# Patient Record
Sex: FEMALE | Race: WHITE
[De-identification: names, ages, dates, MRNs, and addresses within clinical notes are randomized per-mention and may not be internally consistent; named-entity substitution may affect disease eponyms.]

---

## 2018-05-16 ENCOUNTER — HOSPITAL ENCOUNTER (OUTPATIENT)
Dept: HOSPITAL 89 - MRI | Age: 23
End: 2018-05-16
Attending: OPHTHALMOLOGY
Payer: COMMERCIAL

## 2018-05-16 DIAGNOSIS — J34.2: ICD-10-CM

## 2018-05-16 DIAGNOSIS — G93.5: Primary | ICD-10-CM

## 2018-05-16 PROCEDURE — 70553 MRI BRAIN STEM W/O & W/DYE: CPT

## 2018-05-16 PROCEDURE — 70030 X-RAY EYE FOR FOREIGN BODY: CPT

## 2018-05-16 NOTE — RADIOLOGY IMAGING REPORT
FACILITY: [facility] 

 

PATIENT NAME: Judith Chambers

: 1995

MR: [id number]

V: [id number]

EXAM DATE: 

ORDERING PHYSICIAN: RADHA HENSON

TECHNOLOGIST: 

 

Location: [facility] [place]

Patient: Judith Chambers

: 1995

MRN: [id number]

Visit/Account:[id number]

Date of Sevice:  2018

 

ACCESSION #: [id number]

 

EXAMINATION:

MRI Brain without intravenous contrast

MRI Brain with intravenous contrast

 

HISTORY:   Eye twitching.

 

COMPARISON:  Noncontrast head CT dated 10/24/2014.

 

TECHNIQUE:  Multi-planar, multi-sequence brain MRI was performed before and after IV gadolinium.

 

CONTRAST: 15 mL of IV MultiHance

 

FINDINGS:

 

Brain volume:  Normal.

 

Sagittal midline structures: Negative.

 

Ventricles:  Negative.

 

Acute ischemic changes:  None.

 

Hemorrhage:  None.

 

Masses / edema:  None.

 

Enhancement: Negative.

 

Gray-white: Negative.

 

White matter:  Negative.

 

Vessels:  Negative.

 

Extra-axial:  Negative.

 

Calvarium / scalp:  Negative.

 

Skull base:  Chiari I malformation. The exact distance of tonsillar descent is difficult to measure s
econdary to artifact on the sagittal T1-weighted sequence.

 

Visualized sinuses / orbits:  Rightward nasal septal deviation.

 

Visualized upper neck: Negative.

 

IMPRESSION:

 

1. Chiari I malformation. The exact distance of tonsillar descent is difficult to measure secondary t
o artifact on the sagittal T1-weighted sequence.

 

2. Rightward nasal septal deviation.

 

3. Otherwise normal brain MRI without and with IV contrast.

 

Report Dictated By: Larry Gomez MD at 2018 2:15 PM

 

Report E-Signed By: Larry Gomez MD  at 2018 2:20 PM

 

[id number]

## 2018-05-16 NOTE — RADIOLOGY IMAGING REPORT
FACILITY: [facility] [place] 

 

PATIENT NAME: Judith Chambers

: 1995

MR: [id number]

V: [id number]

EXAM DATE: 

ORDERING PHYSICIAN: RADHA HENSON

TECHNOLOGIST: 

 

Location: [facility]

Patient: Judith Chambers

: 1995

MRN: [id number]

Visit/Account:[id number]

Date of Sevice:  2018

 

ACCESSION #: [id number]

 

ORBITS FOREIGN BODY 1 VIEW

 

History: Orbits, evaluate for foreign body

 

 

COMPARISON: 6/10/2014

 

Findings: Single view demonstrates no metallic foreign bodies in the orbits.

 

 

IMPRESSION:

 No evidence of metallic foreign bodies in the orbits.

 

Report Dictated By: Marques Loaiza MD at 2018 12:52 PM

 

Report E-Signed By: Marques Loaiza MD  at 2018 12:54 PM

 

WSN:[id number]

## 2018-08-11 ENCOUNTER — HOSPITAL ENCOUNTER (EMERGENCY)
Dept: HOSPITAL 89 - ER | Age: 23
Discharge: HOME | End: 2018-08-11
Payer: COMMERCIAL

## 2018-08-11 VITALS — DIASTOLIC BLOOD PRESSURE: 86 MMHG | SYSTOLIC BLOOD PRESSURE: 128 MMHG

## 2018-08-11 DIAGNOSIS — M96.842: Primary | ICD-10-CM

## 2018-08-11 LAB — PLATELET COUNT, AUTOMATED: 277 K/UL (ref 150–450)

## 2018-08-11 PROCEDURE — 84132 ASSAY OF SERUM POTASSIUM: CPT

## 2018-08-11 PROCEDURE — 82435 ASSAY OF BLOOD CHLORIDE: CPT

## 2018-08-11 PROCEDURE — 96374 THER/PROPH/DIAG INJ IV PUSH: CPT

## 2018-08-11 PROCEDURE — 84450 TRANSFERASE (AST) (SGOT): CPT

## 2018-08-11 PROCEDURE — 85025 COMPLETE CBC W/AUTO DIFF WBC: CPT

## 2018-08-11 PROCEDURE — 84520 ASSAY OF UREA NITROGEN: CPT

## 2018-08-11 PROCEDURE — 82374 ASSAY BLOOD CARBON DIOXIDE: CPT

## 2018-08-11 PROCEDURE — 84460 ALANINE AMINO (ALT) (SGPT): CPT

## 2018-08-11 PROCEDURE — 72125 CT NECK SPINE W/O DYE: CPT

## 2018-08-11 PROCEDURE — 82310 ASSAY OF CALCIUM: CPT

## 2018-08-11 PROCEDURE — 82565 ASSAY OF CREATININE: CPT

## 2018-08-11 PROCEDURE — 70450 CT HEAD/BRAIN W/O DYE: CPT

## 2018-08-11 PROCEDURE — 84155 ASSAY OF PROTEIN SERUM: CPT

## 2018-08-11 PROCEDURE — 86140 C-REACTIVE PROTEIN: CPT

## 2018-08-11 PROCEDURE — 82247 BILIRUBIN TOTAL: CPT

## 2018-08-11 PROCEDURE — 82040 ASSAY OF SERUM ALBUMIN: CPT

## 2018-08-11 PROCEDURE — 84075 ASSAY ALKALINE PHOSPHATASE: CPT

## 2018-08-11 PROCEDURE — 84295 ASSAY OF SERUM SODIUM: CPT

## 2018-08-11 PROCEDURE — 85651 RBC SED RATE NONAUTOMATED: CPT

## 2018-08-11 PROCEDURE — 99284 EMERGENCY DEPT VISIT MOD MDM: CPT

## 2018-08-11 PROCEDURE — 82947 ASSAY GLUCOSE BLOOD QUANT: CPT

## 2018-08-11 NOTE — RADIOLOGY IMAGING REPORT
FACILITY: [facility] [place] 

 

PATIENT NAME: Judith Chambers

: 1995

MR: [id number]

V: [id number]

EXAM DATE: 

ORDERING PHYSICIAN: JOHNNY EARLY

TECHNOLOGIST: 

 

Location: [facility] [place]

Patient: Judith Chambers

: 1995

MRN: [id number]

Visit/Account:[id number]

Date of Sevice:  2018

 

ACCESSION #: [id number]

 

HEAD W/O CONTRAST

 

HISTORY:  post Arnold-chiari surgery; neck pain

 

TECHNIQUE:  CT head without contrast.

 

One of the following dose optimization techniques was utilized in the performance of this exam: Autom
ated exposure control; adjustment of the mA and/or kV according to the patient's size; or use of an i
terative  reconstruction technique.  Specific details can be referenced in the facility's radiology C
T exam operational policy.

 

COMPARISON:  10/24/2014

 

FINDINGS:

Cerebral and cerebellar parenchyma well preserved. No evidence of acute cortical infarct, intracrania
l hemorrhage, mass, mass effect, hydrocephalus or extra-axial collection. Chiari I malformation with 
interval suboccipital craniotomy. Osseous structures otherwise unremarkable, intact and normally alig
maria eugenia. Visualized aspects paranasal sinuses and mastoid air cells clear.

 

IMPRESSION:

Chiari I malformation with interval suboccipital craniotomy, otherwise unremarkable exam without evid
ence of an acute intracranial process.

 

Report Dictated By: Jai Gomez MD at 2018 11:10 AM

 

Report E-Signed By: Jai Gomez MD  at 2018 11:20 AM

 

WSN:[id number]

## 2018-08-11 NOTE — RADIOLOGY IMAGING REPORT
FACILITY: [facility] [place] 

 

PATIENT NAME: Judith Chambers

: 1995

MR: [id number]

V: [id number]

EXAM DATE: 

ORDERING PHYSICIAN: JOHNNY EARLY

TECHNOLOGIST: 

 

Location: [facility]

Patient: Judith Chambers

: 1995

MRN: [id number]

Visit/Account:[id number]

Date of Sevice:  2018

 

ACCESSION #: [id number]

 

C-SPINE W/O CONTRAST

 

HISTORY:  post Arnold-chiari surgery; neck pain

 

TECHNIQUE:  CT cervical spine without contrast.

 

One of the following dose optimization techniques was utilized in the performance of this exam: Autom
ated exposure control; adjustment of the mA and/or kV according to the patient's size; or use of an i
terative  reconstruction technique.  Specific details can be referenced in the facility's radiology C
T exam operational policy.

 

COMPARISON:  None.

 

FINDINGS:

Chiari I malformation with sequela of recent suboccipital craniotomy and resection of the posterior a
rch C1. Along the resection site and immediately posterior to the spinal canal is a low attenuating f
luid collection measuring approximately 0.8 x 2.7 x 3.6 cm in greatest dimensions (234 and 60) wit
h most likely considerations being seroma or hematoma, less likely pseudomeningocele unless clinicall
y suspected.

 

Osseous structures otherwise unremarkable and intact. Straightening of normal cervical lordosis, like
ly positional.

 

Visualized lung apices grossly clear. Postoperative changes of the soft tissues posterior neck with m
ild likely reactive right posterior subcutaneous adenopathy, largest node measuring 0.7 x 1.2 cm (2/3
7).

 

IMPRESSION:

 

1. Chiari I malformation with sequela of recent suboccipital craniotomy and resection of the posterio
r arch C1.

2. 0.8 x 2.7 x 3.6 cm fluid collection along the resection site and posterior to the spinal canal, mo
st likely considerations being seroma or hematoma, less likely pseudomeningocele unless clinically navarrete
spected.

3. Mild likely reactive cervical adenopathy.

 

Report Dictated By: Jai Gomez MD at 2018 11:15 AM

 

Report E-Signed By: Jai Gomez MD  at 2018 11:19 AM

 

WSN:[id number]

## 2018-08-11 NOTE — ER REPORT
History and Physical


Time Seen By MD:  09:40


Hx. of Stated Complaint:  


/NECK SURGERY FOR CHIARI MALFORMATION ON 18 - WAS DOING WELL AND STARTED 


HAVING NECK PAIN YESTERDAY.


HPI/ROS


CHIEF COMPLAINT: Midline neck pain





HISTORY OF PRESENT ILLNESS: Patient is a 23-year-old female who is a history of 

Arnold-Chiari malformation with recent surgery on  at [facility] in [facility]. Patient had been doing well postoperatively until 

yesterday she went back to work and states that she was doing a lot of bending 

over which she was instructed not to do and since that time she's been having 

midline neck pain. She denies any fevers or chills. She denies any numbness or 

tingling in the extremities. She denies chest pain or shortness of breath. She 

does report head pain atthe skull but no headache. Symptoms made worse with 

motion.





REVIEW OF SYSTEMS:


Respiratory: No cough, no dyspnea.


Cardiovascular: No chest pain, no palpitations.


Gastrointestinal: No vomiting, no abdominal pain.


Musculoskeletal: No back pain. Lower cervical neck pain.


Allergies:  


Coded Allergies:  


     No Known Drug Allergies (Unverified , 17)


Home Meds


No Active Prescriptions or Reported Meds


Past Medical/Surgical History


Past medical history for headaches and Arnold-Chiari malformation 1 status post 

surgery which was performed at [facility] in [facility] 

on .


Hx Smoking:  No


Smoking Status:  Never Smoker


Exposure to Second Hand Smoke?:  No


Constitutional





Vital Sign - Last 24 Hours








 18





 09:29


 


Temp 98.7


 


Pulse 95


 


Resp 18


 


B/P (MAP) 118/90


 


Pulse Ox 95


 


O2 Delivery Room Air








Physical Exam


  General Appearance: The patient is alert, has no immediate need for airway 

protection and no current signs of toxicity.  


Eyes: Pupils equal and round no injection.


Neck: He has a well-healed surgical scar that extends from approximately C5-C7. 

There is no overlying erythema no expressible pus and no fluctuant mass noted.


Respiratory: Chest is non tender, lungs are clear to auscultation.


Cardiac: regular rate and rhythm 


Gastrointestinal: Abdomen is soft and non tender, no masses, bowel sounds 

normal.


   Extremities have full range of motion and are non tender.


Skin: No rashes or lesions.





Medical Decision Making


Data Points


Result Diagram:  


18 1010                                                                   

             18 1010





Laboratory





Hematology








Test


  18


10:10


 


Red Blood Count


  4.65 M/uL


(4.17-5.56)


 


Mean Corpuscular Volume


  89.2 fL


(80.0-96.0)


 


Mean Corpuscular Hemoglobin


  31.6 pg


(26.0-33.0)


 


Mean Corpuscular Hemoglobin


Concent 35.4 g/dL


(32.0-36.0)


 


Red Cell Distribution Width


  13.2 %


(11.5-14.5)


 


Mean Platelet Volume


  7.6 fL


(7.2-11.1)


 


Neutrophils (%) (Auto)


  65.8 %


(39.4-72.5)


 


Lymphocytes (%) (Auto)


  22.2 %


(17.6-49.6)


 


Monocytes (%) (Auto)


  8.9 %


(4.1-12.4)


 


Eosinophils (%) (Auto)


  2.8 %


(0.4-6.7)


 


Basophils (%) (Auto)


  0.3 %


(0.3-1.4)


 


Nucleated RBC Relative Count


(auto) 0.1 /100WBC 


 


 


Neutrophils # (Auto)


  5.9 K/uL


(2.0-7.4)


 


Lymphocytes # (Auto)


  2.0 K/uL


(1.3-3.6)


 


Monocytes # (Auto)


  0.8 K/uL


(0.3-1.0)


 


Eosinophils # (Auto)


  0.2 K/uL


(0.0-0.5)


 


Basophils # (Auto)


  0.0 K/uL


(0.0-0.1)


 


Nucleated RBC Absolute Count


(auto) 0.01 K/uL 


 


 


Peripheral Blood Smear No Y/N 


 


Erythrocyte Sedimentation Rate


  4 mm/HOUR


(0-20)


 


Sodium Level


  141 mmol/L


(137-145)


 


Potassium Level


  3.9 mmol/L


(3.5-5.0)


 


Chloride Level


  105 mmol/L


()


 


Carbon Dioxide Level


  23 mmol/L


(22-31)


 


Blood Urea Nitrogen


  13 mg/dl


(7-18)


 


Creatinine


  0.90 mg/dl


(0.52-1.04)


 


Glomerular Filtration Rate


Calc > 60.0 


 


 


Random Glucose


  91 mg/dl


()


 


Calcium Level


  9.1 mg/dl


(8.4-10.2)


 


Total Bilirubin


  0.7 mg/dl


(0.2-1.3)


 


Aspartate Amino Transf


(AST/SGOT) 18 U/L (0-35) 


 


 


Alanine Aminotransferase


(ALT/SGPT) 26 U/L (0-56) 


 


 


Alkaline Phosphatase 84 U/L (0-126) 


 


C-Reactive Protein


  1.4 mg/dl


(<1.0)


 


Total Protein


  7.3 g/dl


(6.3-8.2)


 


Albumin


  4.4 g/dl


(3.5-5.0)








Chemistry








Test


  18


10:10


 


White Blood Count


  9.0 k/uL


(4.5-11.0)


 


Red Blood Count


  4.65 M/uL


(4.17-5.56)


 


Hemoglobin


  14.7 g/dL


(12.0-16.0)


 


Hematocrit


  41.5 %


(34.0-47.0)


 


Mean Corpuscular Volume


  89.2 fL


(80.0-96.0)


 


Mean Corpuscular Hemoglobin


  31.6 pg


(26.0-33.0)


 


Mean Corpuscular Hemoglobin


Concent 35.4 g/dL


(32.0-36.0)


 


Red Cell Distribution Width


  13.2 %


(11.5-14.5)


 


Platelet Count


  277 K/uL


(150-450)


 


Mean Platelet Volume


  7.6 fL


(7.2-11.1)


 


Neutrophils (%) (Auto)


  65.8 %


(39.4-72.5)


 


Lymphocytes (%) (Auto)


  22.2 %


(17.6-49.6)


 


Monocytes (%) (Auto)


  8.9 %


(4.1-12.4)


 


Eosinophils (%) (Auto)


  2.8 %


(0.4-6.7)


 


Basophils (%) (Auto)


  0.3 %


(0.3-1.4)


 


Nucleated RBC Relative Count


(auto) 0.1 /100WBC 


 


 


Neutrophils # (Auto)


  5.9 K/uL


(2.0-7.4)


 


Lymphocytes # (Auto)


  2.0 K/uL


(1.3-3.6)


 


Monocytes # (Auto)


  0.8 K/uL


(0.3-1.0)


 


Eosinophils # (Auto)


  0.2 K/uL


(0.0-0.5)


 


Basophils # (Auto)


  0.0 K/uL


(0.0-0.1)


 


Nucleated RBC Absolute Count


(auto) 0.01 K/uL 


 


 


Peripheral Blood Smear No Y/N 


 


Erythrocyte Sedimentation Rate


  4 mm/HOUR


(0-20)


 


Glomerular Filtration Rate


Calc > 60.0 


 


 


Calcium Level


  9.1 mg/dl


(8.4-10.2)


 


Total Bilirubin


  0.7 mg/dl


(0.2-1.3)


 


Aspartate Amino Transf


(AST/SGOT) 18 U/L (0-35) 


 


 


Alanine Aminotransferase


(ALT/SGPT) 26 U/L (0-56) 


 


 


Alkaline Phosphatase 84 U/L (0-126) 


 


C-Reactive Protein


  1.4 mg/dl


(<1.0)


 


Total Protein


  7.3 g/dl


(6.3-8.2)


 


Albumin


  4.4 g/dl


(3.5-5.0)











EKG/Imaging


Imaging


FACILITY: [facility] - [place] 


 


PATIENT NAME: Judith Chambers


: 1995


MR: [id number]


V: [id number]


EXAM DATE: [id number]


ORDERING PHYSICIAN: JOHNNY EARLY


TECHNOLOGIST: 


 


Location: [facility]


Patient: Judith Chambers


: 1995


MRN: [id number]


Visit/Account:[id number]


Date of Sevice:  2018


 


ACCESSION #: [id number]


 


HEAD W/O CONTRAST


 


HISTORY:  post Arnold-chiari surgery; neck pain


 


TECHNIQUE:  CT head without contrast.


 


One of the following dose optimization techniques was utilized in the 

performance of this exam: Automated exposure control; adjustment of the mA and/

or kV according to the patient's size; or use of an iterative  reconstruction 

technique.  Specific details can be referenced in the facility's radiology CT 

exam operational policy.


 


COMPARISON:  10/24/2014


 


FINDINGS:


Cerebral and cerebellar parenchyma well preserved. No evidence of acute 

cortical infarct, intracranial hemorrhage, mass, mass effect, hydrocephalus or 

extra-axial collection. Chiari I malformation with interval suboccipital 

craniotomy. Osseous structures otherwise unremarkable, intact and normally 

aligned. Visualized aspects paranasal sinuses and mastoid air cells clear.


 


IMPRESSION:


Chiari I malformation with interval suboccipital craniotomy, otherwise 

unremarkable exam without evidence of an acute intracranial process.


 


Report Dictated By: Jai Gomez MD at 2018 11:10 AM


 


Report E-Signed By: Jai Gomez MD  at 2018 11:20 AM


 


WSN:[id number]





FACILITY: [facility] - [place] 


 


PATIENT NAME: Judith Chambers


: 1995


MR: [id number]


V: [id number]


EXAM DATE: 


ORDERING PHYSICIAN: JOHNNY EARLY


TECHNOLOGIST: 


 


Location: [facility]


Patient: Judith Chambers


: 1995


MRN: [id number]


Visit/Account:[id number]


Date of Sevice:  2018


 


ACCESSION #: [id number]


 


C-SPINE W/O CONTRAST


 


HISTORY:  post Arnold-chiari surgery; neck pain


 


TECHNIQUE:  CT cervical spine without contrast.


 


One of the following dose optimization techniques was utilized in the 

performance of this exam: Automated exposure control; adjustment of the mA and/

or kV according to the patient's size; or use of an iterative  reconstruction 

technique.  Specific details can be referenced in the facility's radiology CT 

exam operational policy.


 


COMPARISON:  None.


 


FINDINGS:


Chiari I malformation with sequela of recent suboccipital craniotomy and 

resection of the posterior arch C1. Along the resection site and immediately 

posterior to the spinal canal is a low attenuating fluid collection measuring 

approximately 0.8 x 2.7 x 3.6 cm in greatest dimensions (2/34 and 6/60) with 

most likely considerations being seroma or hematoma, less likely 

pseudomeningocele unless clinically suspected.


 


Osseous structures otherwise unremarkable and intact. Straightening of normal 

cervical lordosis, likely positional.


 


Visualized lung apices grossly clear. Postoperative changes of the soft tissues 

posterior neck with mild likely reactive right posterior subcutaneous adenopathy

, largest node measuring 0.7 x 1.2 cm (2/37).


 


IMPRESSION:


 


1. Chiari I malformation with sequela of recent suboccipital craniotomy and 

resection of the posterior arch C1.


2. 0.8 x 2.7 x 3.6 cm fluid collection along the resection site and posterior 

to the spinal canal, most likely considerations being seroma or hematoma, less 

likely pseudomeningocele unless clinically suspected.


3. Mild likely reactive cervical adenopathy.


 


Report Dictated By: Jai Gomez MD at 2018 11:15 AM


 


Report E-Signed By: Jai Gomez MD  at 2018 11:19 AM


 


WSN:[id number]





ED Course/Re-evaluation


Clinical Indication for ER IV:  IV Access


ED Course


 2018 9:57:11 am After history and physical exam was performed 

differential diagnosis was formulated which includes but is not limited to 

cervical radiculopathy, muscle strain, subacute surgical infection. Plan at 

this time will be to perform CT of the head and C-spine without contrast. We'll 

also check CBC CMP C-reactive protein and ESR. Patient was offered pain 

medication she would just like to have Tylenol at this point. We can give 

additional pain medicine if required.





 2018 11:33:44 am bloodwork is unremarkable. CT of the head is 

unremarkable for acute process CT of the neck does show small fluid collection 

which could represent hematoma or seroma. I doubt this represents infection or 

phlegmon. Plan at this time will be discharge home continue nonsteroidal anti-

inflammatory medications. Patient to return if she develops fever or increasing 

swelling and redness over the surgical site.


Decision to Disposition Date:  Aug 11, 2018


Decision to Disposition Time:  11:34





Depart


Departure


Latest Vital Signs





Vital Signs








  Date Time  Temp Pulse Resp B/P (MAP) Pulse Ox O2 Delivery O2 Flow Rate FiO2


 


18 09:29 98.7 95 18 118/90 95 Room Air  








Impression:  


 Primary Impression:  


 Seroma after procedure


Condition:  Condition Unchanged


Disposition:  HOME OR SELF-CARE


Referrals:  


CAIRN NERI DO (PCP)


2 Days


if symptoms persist


New Scripts


No Active Prescriptions or Reported Meds


Patient Instructions:  Neck Pain (DC)





Additional Instructions:  


Motrin or Tylenol as directed for pain. Return to the emergency department if 

he developed fever which would be a temperature of 100.4 or higher, redness to 

the surgical site increased swelling or pain.











JOHNNY EARLY MD Aug 11, 2018 09:57